# Patient Record
Sex: FEMALE | Race: WHITE | NOT HISPANIC OR LATINO | Employment: OTHER | URBAN - METROPOLITAN AREA
[De-identification: names, ages, dates, MRNs, and addresses within clinical notes are randomized per-mention and may not be internally consistent; named-entity substitution may affect disease eponyms.]

---

## 2022-08-01 DIAGNOSIS — M79.671 RIGHT FOOT PAIN: Primary | ICD-10-CM

## 2022-08-04 ENCOUNTER — PATIENT MESSAGE (OUTPATIENT)
Dept: ORTHOPEDICS | Facility: CLINIC | Age: 52
End: 2022-08-04

## 2022-08-04 ENCOUNTER — HOSPITAL ENCOUNTER (OUTPATIENT)
Dept: RADIOLOGY | Facility: HOSPITAL | Age: 52
Discharge: HOME | End: 2022-08-04
Attending: ORTHOPAEDIC SURGERY
Payer: COMMERCIAL

## 2022-08-04 ENCOUNTER — OFFICE VISIT (OUTPATIENT)
Dept: ORTHOPEDICS | Facility: CLINIC | Age: 52
End: 2022-08-04
Payer: COMMERCIAL

## 2022-08-04 VITALS — BODY MASS INDEX: 35.15 KG/M2 | HEIGHT: 68 IN | WEIGHT: 231.94 LBS

## 2022-08-04 DIAGNOSIS — S92.901K CLOSED FRACTURE OF RIGHT FOOT WITH NONUNION, SUBSEQUENT ENCOUNTER: ICD-10-CM

## 2022-08-04 DIAGNOSIS — M79.671 RIGHT FOOT PAIN: ICD-10-CM

## 2022-08-04 DIAGNOSIS — G90.521 COMPLEX REGIONAL PAIN SYNDROME I OF RIGHT LOWER LIMB: ICD-10-CM

## 2022-08-04 DIAGNOSIS — S92.024A CLOSED NONDISPLACED FRACTURE OF ANTERIOR PROCESS OF RIGHT CALCANEUS, INITIAL ENCOUNTER: Primary | ICD-10-CM

## 2022-08-04 PROCEDURE — 99999 PR PBB SHADOW E&M-EST. PATIENT-LVL IV: CPT | Mod: PBBFAC,,, | Performed by: ORTHOPAEDIC SURGERY

## 2022-08-04 PROCEDURE — 3008F PR BODY MASS INDEX (BMI) DOCUMENTED: ICD-10-PCS | Mod: CPTII,S$GLB,, | Performed by: ORTHOPAEDIC SURGERY

## 2022-08-04 PROCEDURE — 1159F MED LIST DOCD IN RCRD: CPT | Mod: CPTII,S$GLB,, | Performed by: ORTHOPAEDIC SURGERY

## 2022-08-04 PROCEDURE — 73630 XR FOOT COMPLETE 3 VIEW RIGHT: ICD-10-PCS | Mod: 26,RT,, | Performed by: RADIOLOGY

## 2022-08-04 PROCEDURE — 99999 PR PBB SHADOW E&M-EST. PATIENT-LVL IV: ICD-10-PCS | Mod: PBBFAC,,, | Performed by: ORTHOPAEDIC SURGERY

## 2022-08-04 PROCEDURE — 3008F BODY MASS INDEX DOCD: CPT | Mod: CPTII,S$GLB,, | Performed by: ORTHOPAEDIC SURGERY

## 2022-08-04 PROCEDURE — 73630 X-RAY EXAM OF FOOT: CPT | Mod: 26,RT,, | Performed by: RADIOLOGY

## 2022-08-04 PROCEDURE — 99204 PR OFFICE/OUTPT VISIT, NEW, LEVL IV, 45-59 MIN: ICD-10-PCS | Mod: S$GLB,,, | Performed by: ORTHOPAEDIC SURGERY

## 2022-08-04 PROCEDURE — 1159F PR MEDICATION LIST DOCUMENTED IN MEDICAL RECORD: ICD-10-PCS | Mod: CPTII,S$GLB,, | Performed by: ORTHOPAEDIC SURGERY

## 2022-08-04 PROCEDURE — 73630 X-RAY EXAM OF FOOT: CPT | Mod: TC,RT

## 2022-08-04 PROCEDURE — 99204 OFFICE O/P NEW MOD 45 MIN: CPT | Mod: S$GLB,,, | Performed by: ORTHOPAEDIC SURGERY

## 2022-08-04 NOTE — PROGRESS NOTES
Patient ID: Jorge Chavez  YOB: 1970  MRN: [id number]    Chief Complaint: Pain and Injury of the Right Foot    Referred By: Dr. Latisha Waggoner's cousin    History of Present Illness: Jorge Chavez is a 52 y.o. female   homemaker with history of heart disease/surgery with a chief complaint of Pain and Injury of the Right Foot    Ms. Chavez presents today for second opinion of right foot injury.  She injured her foot on 22 when she tripped and landed with full weight on her foot.  She heard a pop and had immediate pain, swelling, and inability to bear weight.  She was initially treated at the ED and saw [facility] Ch[facility].  XR demonstrated a lateral process calcaneus fracture.  Conservative treatment was initiated with a walking boot and limited weightbearing.  An MRI was also ordered to evaluate ligaments, which confirmed the XR diagnosis.  After a few months she transferred her care to Dr. Garcia with [facility] due to proximity to her home in [place].  She continued to have persistent pain.  Dr. Garcia ordered new XR, diagnosed her with nonunion, and placed her in a boot, then a cast.  She removed her own cast after 5 weeks at the end of July because she accidentally got it wet.  She has put herself back in the boot in the meantime and uses a knee scooter.    She complains of persistent lateral foot and ankle pain and inability to bear weight.  She would like a second opinion to determine if there is additional treatment that would help her foot heal more quickly.  She is currently using ibuprofen and tylenol.    Past Medical History:   Past Medical History:   Diagnosis Date    ASD (atrial septal defect)     Had surgery to repair in .    Migraine     Tachycardia      Past Surgical History:   Procedure Laterality Date    ASD REPAIR      BILATERAL TUBAL LIGATION      BREAST RECONSTRUCTION Bilateral 2003     SECTION      x 2    ENDOMETRIAL  "ABLATION      TOTAL REDUCTION MAMMOPLASTY Bilateral 2003     Family History   Problem Relation Age of Onset    Diabetes Paternal Grandfather     Diabetes Paternal Grandmother     Diabetes Maternal Grandmother     Diabetes Maternal Grandfather     Diabetes Father     Guillain-Columbus syndrome Father     Heart disease Father     Diabetes Mother     Stroke Mother          from stroke @ age 75    Stroke Brother         Brother  from stroke @ age 56     Social History     Socioeconomic History    Marital status:    Tobacco Use    Smoking status: Never Smoker    Smokeless tobacco: Never Used   Substance and Sexual Activity    Alcohol use: Never    Drug use: Never    Sexual activity: Yes     Partners: Male     Birth control/protection: See Surgical Hx     Comment: Tubal Ligation 2006     Medication List with Changes/Refills   Current Medications    ASCORBIC ACID, VITAMIN C, (VITAMIN C) 1000 MG TABLET    Take 1,000 mg by mouth once daily.    CYANOCOBALAMIN/THIAMINE HCL (HERMELINDO-B-12 ORAL)    Take by mouth.    ESTRADIOL (ESTRACE) 0.01 % (0.1 MG/GRAM) VAGINAL CREAM        ESTRADIOL-NORETHINDRONE (ACTIVELLA) 1-0.5 MG PER TABLET    Take 1 tablet by mouth once daily.    PROPRANOLOL (INDERAL LA) 80 MG 24 HR CAPSULE        TOLTERODINE (DETROL LA) 2 MG CP24        VITAMIN D (VITAMIN D3) 1000 UNITS TAB    Take 1,000 Units by mouth once daily.    ZINC SULFATE (ZINC-15) 66 MG TAB    Take by mouth.   Discontinued Medications    ONETOUCH VERIO TEST STRIPS STRP         Review of patient's allergies indicates:   Allergen Reactions    Clindamycin Blisters, Hives, Itching, Nausea Only, Rash and Swelling    Erythromycin base Anaphylaxis    Latex, natural rubber Hives, Itching, Rash and Shortness Of Breath    Penicillins Anaphylaxis and Swelling     Allergic to all "Cillins!"    Povidone-iodine Rash    Sulfa (sulfonamide antibiotics) Anaphylaxis, Hives, Itching and Swelling    Adhesive Blisters, " Dermatitis, Hives, Itching and Rash    Aspirin Nausea And Vomiting    Tuberculin robyn-ppd Rash     ROS    Physical Exam:   Body mass index is 35.26 kg/m².  There were no vitals filed for this visit.   GENERAL: Well appearing, appropriate for stated age, no acute distress.  CARDIOVASCULAR: Pulses regular by peripheral palpation.  PULMONARY: Respirations are even and non-labored.  NEURO: Awake, alert, and oriented x 3.  PSYCH: Mood & affect are appropriate.  HEENT: Head is normocephalic and atraumatic.    Right ankle:  Inspection: General atrophy, mild swelling sinus tarsi  Palpation tenderness: Tender lateral process calcaneus, cuboid, anterior tibiofibular ligament  Range of motion: Full ROM unrestricted, equal bilaterally  Strength:  4/5 DF    4/5 EHL    5/5 PF    5/5 Inversion    4/5 Eversion with pain  Special Tests: - Ant Drawer with pain  N/V Exam:  Tibial:    Normal sensory (plantar foot)  Normal motor (FHL)    Sup Peroneal:   Diminished sensory (dorsal foot) Diminished motor (Peroneals/Ant Tib)            Deep Peroneal:   Diminished sensory (1st web space) Diminished motor (EHL)    Sural:   Normal sensory (lateral foot)   Saphenous:   Normal sensory (medial lower leg)   Normal pedal pulses, warm and well perfused with capillary refill < 2 sec    Calf soft and compressible    Imaging:    X-Ray Foot Complete Right  Narrative: EXAMINATION:  XR FOOT COMPLETE 3 VIEW RIGHT    CLINICAL HISTORY:  . Pain in right foot    TECHNIQUE:  AP, lateral, and oblique views of the right foot were performed.    COMPARISON:  None    FINDINGS:  There is subtle cortical irregularity and thin linear lucency seen involving the anterior process of the calcaneus which is concerning for possible nondisplaced fracture.  Recommend clinical correlation for site of tenderness.  Remaining visualized osseous structures appear intact.  No evidence of dislocation.  There are mild degenerative findings present at the 1st MTP joint and scattered  throughout the midfoot.  No erosive changes demonstrated.  Impression: Possible nondisplaced fracture involving the anterior process of the calcaneus    This report was flagged in Epic as abnormal..    Electronically signed by: Steve Ocampo MD  Date:    08/04/2022  Time:    08:10    MRI R foot 1/24/2022 reviewed today  Impression Qi Soler MD  1. Bone marrow edema and contusion, bone bruise in the anterior process of the calcaneus with a linear nondisplaced fracture of the anterosuperior lateral lip of the anterior process of the calcaneus. Bone marrow edema and contusion, bone bruise in the cuboid bone across the calcaneocuboid articulation.  2. Small ankle joint effusion.  Mild retrocalcaneal bursitis.    Relevant imaging results reviewed and interpreted by me, discussed with the patient and / or family today. I agree with findings stated above.       Patient Instructions     Assessment:  Jorge Chavez is a 52 y.o. 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    Closed nondisplaced fracture of anterior process of right calcaneus, initial encounter Yes    Closed fracture of right foot with nonunion, subsequent encounter       Plan:   Order CT scan and MRI of right foot   Continue NWB in walking boot with knee scooter   Referral to interventional pain management to discuss CRPS   May consider bone stimulator pending imaging results    Follow-up: after imaging or sooner if there are any problems between now and then.    Leave Review:    Google: Leave Google Review   Healthgrades: Leave Healthgrades Review    After Hours Number: [phone]        Provider Note/Medical Decision Making:      I discussed worrisome and red flag signs and symptoms with the patient. The patient expressed understanding and agreed to  alert me immediately or to go to the emergency room if they experience any of these.    Treatment plan was developed with input from the patient/family, and they expressed understanding and agreement with the plan. All questions were answered today.    Disclaimer: This note was prepared using a voice recognition system and is likely to have sound alike errors within the text.     I, Sachin Borges, acted as a scribe for Yamil Good MD for the duration of this office visit.

## 2022-08-04 NOTE — PATIENT INSTRUCTIONS
Assessment:  Jorge Chavez is a 52 y.o. 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    Closed nondisplaced fracture of anterior process of right calcaneus, initial encounter Yes    Closed fracture of right foot with nonunion, subsequent encounter       Plan:  Order CT scan and MRI of right foot  Continue NWB in walking boot with knee scooter  Referral to interventional pain management to discuss CRPS  May consider bone stimulator pending imaging results    Follow-up: after imaging or sooner if there are any problems between now and then.    Leave Review:   Google: Leave Google Review  Healthgrades: Leave Healthgrades Review    After Hours Number: [phone]

## 2022-08-08 ENCOUNTER — HOSPITAL ENCOUNTER (OUTPATIENT)
Dept: RADIOLOGY | Facility: HOSPITAL | Age: 52
Discharge: HOME | End: 2022-08-08
Attending: ORTHOPAEDIC SURGERY
Payer: COMMERCIAL

## 2022-08-08 DIAGNOSIS — S92.024A CLOSED NONDISPLACED FRACTURE OF ANTERIOR PROCESS OF RIGHT CALCANEUS, INITIAL ENCOUNTER: ICD-10-CM

## 2022-08-08 PROCEDURE — 73718 MRI LOWER EXTREMITY W/O DYE: CPT | Mod: 26,RT,, | Performed by: RADIOLOGY

## 2022-08-08 PROCEDURE — 73700 CT LOWER EXTREMITY W/O DYE: CPT | Mod: TC,PO,RT

## 2022-08-08 PROCEDURE — 73700 CT LOWER EXTREMITY W/O DYE: CPT | Mod: 26,RT,, | Performed by: RADIOLOGY

## 2022-08-08 PROCEDURE — 73718 MRI LOWER EXTREMITY W/O DYE: CPT | Mod: TC,PO,RT

## 2022-08-08 PROCEDURE — 73700 CT FOOT WITHOUT CONTRAST RIGHT: ICD-10-PCS | Mod: 26,RT,, | Performed by: RADIOLOGY

## 2022-08-08 PROCEDURE — 73718 MRI FOOT (MIDFOOT) RIGHT WITHOUT CONTRAST: ICD-10-PCS | Mod: 26,RT,, | Performed by: RADIOLOGY

## 2022-08-09 ENCOUNTER — OFFICE VISIT (OUTPATIENT)
Dept: PAIN MEDICINE | Facility: CLINIC | Age: 52
End: 2022-08-09
Payer: COMMERCIAL

## 2022-08-09 VITALS
WEIGHT: 230.63 LBS | HEIGHT: 68 IN | DIASTOLIC BLOOD PRESSURE: 77 MMHG | SYSTOLIC BLOOD PRESSURE: 165 MMHG | HEART RATE: 62 BPM | BODY MASS INDEX: 34.95 KG/M2

## 2022-08-09 DIAGNOSIS — G90.521 COMPLEX REGIONAL PAIN SYNDROME I OF RIGHT LOWER LIMB: Primary | ICD-10-CM

## 2022-08-09 DIAGNOSIS — S92.024A CLOSED NONDISPLACED FRACTURE OF ANTERIOR PROCESS OF RIGHT CALCANEUS, INITIAL ENCOUNTER: ICD-10-CM

## 2022-08-09 PROCEDURE — 99203 PR OFFICE/OUTPT VISIT, NEW, LEVL III, 30-44 MIN: ICD-10-PCS | Mod: S$GLB,,, | Performed by: ANESTHESIOLOGY

## 2022-08-09 PROCEDURE — 1159F MED LIST DOCD IN RCRD: CPT | Mod: CPTII,S$GLB,, | Performed by: ANESTHESIOLOGY

## 2022-08-09 PROCEDURE — 3078F PR MOST RECENT DIASTOLIC BLOOD PRESSURE < 80 MM HG: ICD-10-PCS | Mod: CPTII,S$GLB,, | Performed by: ANESTHESIOLOGY

## 2022-08-09 PROCEDURE — 99999 PR PBB SHADOW E&M-EST. PATIENT-LVL IV: ICD-10-PCS | Mod: PBBFAC,,, | Performed by: ANESTHESIOLOGY

## 2022-08-09 PROCEDURE — 3077F SYST BP >= 140 MM HG: CPT | Mod: CPTII,S$GLB,, | Performed by: ANESTHESIOLOGY

## 2022-08-09 PROCEDURE — 3008F PR BODY MASS INDEX (BMI) DOCUMENTED: ICD-10-PCS | Mod: CPTII,S$GLB,, | Performed by: ANESTHESIOLOGY

## 2022-08-09 PROCEDURE — 3077F PR MOST RECENT SYSTOLIC BLOOD PRESSURE >= 140 MM HG: ICD-10-PCS | Mod: CPTII,S$GLB,, | Performed by: ANESTHESIOLOGY

## 2022-08-09 PROCEDURE — 3078F DIAST BP <80 MM HG: CPT | Mod: CPTII,S$GLB,, | Performed by: ANESTHESIOLOGY

## 2022-08-09 PROCEDURE — 99999 PR PBB SHADOW E&M-EST. PATIENT-LVL IV: CPT | Mod: PBBFAC,,, | Performed by: ANESTHESIOLOGY

## 2022-08-09 PROCEDURE — 3008F BODY MASS INDEX DOCD: CPT | Mod: CPTII,S$GLB,, | Performed by: ANESTHESIOLOGY

## 2022-08-09 PROCEDURE — 99203 OFFICE O/P NEW LOW 30 MIN: CPT | Mod: S$GLB,,, | Performed by: ANESTHESIOLOGY

## 2022-08-09 PROCEDURE — 1159F PR MEDICATION LIST DOCUMENTED IN MEDICAL RECORD: ICD-10-PCS | Mod: CPTII,S$GLB,, | Performed by: ANESTHESIOLOGY

## 2022-08-09 RX ORDER — GABAPENTIN 300 MG/1
600 CAPSULE ORAL NIGHTLY
Qty: 60 CAPSULE | Refills: 1 | Status: SHIPPED | OUTPATIENT
Start: 2022-08-09 | End: 2022-08-24

## 2022-08-09 NOTE — PROGRESS NOTES
New Patient Interventional Pain Note (Initial Visit)    Referring Physician: Yamil Good MD    PCP: Primary Doctor No    Chief Complaint:   Chief Complaint   Patient presents with    Foot Pain        SUBJECTIVE:    Jorge Chavez is a 52 y.o. female who presents to the clinic for the evaluation of right foot pain.   Patient suffered right foot injury on 01/04/2022.  She was initially evaluated by Dr. Olvera.  Patient was initially given a boot for lateral process calcaneus fracture, however this was then discontinued after 3 weeks.  She then was evaluated by Dr. Garcia reason recommended casting of the right foot for 5 weeks.  Patient reports minimal relief after these episodes of wearing the boot in the cast.  She reports since her injury that she has had fluctuating episodes of pain in her right foot.  Pain is described as a burning stabbing pain across the lateral aspect of her right foot with associated color changes, weakness, flexion, hyperalgesia, cold intolerance, and decreased sensation of the lateral aspect of her right foot.  Pain is worse with standing and at night, better during the day and with rest.  Primary pain is a burning pain over the lateral aspect right foot.    The pain is rated with a score of  3/10 on the BEST day and a score of 9/10 on the WORST day.  Symptoms interfere with daily activity, sleeping and work. The pain is exacerbated by Walking and Night Time.  The pain is mitigated by medications and rest.     Patient denies night fever/night sweats, urinary incontinence, bowel incontinence, significant weight loss, significant motor weakness and loss of sensations.      Non-Pharmacologic Treatments:  Physical Therapy/Home Exercise: yes  Ice/Heat:yes  TENS: no  Acupuncture: no  Massage: no  Chiropractic: no        Previous Pain Medications:  NSAIDs, Tylenol, topicals     report:  Reviewed and consistent with medication use as prescribed.    Pain Procedures:   none      Imaging:   MRI  Foot (Midfoot) Right Without Contrast  Narrative: EXAMINATION:  MRI FOOT (MIDFOOT) RIGHT WITHOUT CONTRAST    CLINICAL HISTORY:  anterior process calcaneus nonunion;  Nondisplaced fracture of anterior process of right calcaneus, initial encounter for closed fracture    TECHNIQUE:  Multisequence, multiplanar MR imaging of the midfoot performed without contrast.    COMPARISON:  Prior radiograph and CT    FINDINGS:  Again identified is the nondisplaced fracture within the anterior process of the calcaneus with mild adjacent edema present.  Remaining osseous structures intact.  Visualized soft tissues are unremarkable.  Impression: Nonunited nondisplaced anterior process calcaneal fracture with mild edema adjacent to the fracture line.    Electronically signed by: Sami Orozco MD  Date:    08/08/2022  Time:    12:45  CT Foot Without Contrast Right  Narrative: EXAMINATION:  CT FOOT WITHOUT CONTRAST RIGHT    CLINICAL HISTORY:  Fracture, foot;  Nondisplaced fracture of anterior process of right calcaneus, initial encounter for closed fracture    TECHNIQUE:  Multi detector volumetric CT imaging of the left foot was obtained.    RADIATION DOSE:  One thousand one hundred seventy-four DLP.    Automated exposure control was utilized    COMPARISON:  Conventional radiographs of the right foot 08/04/2022    FINDINGS:  There is a nondisplaced chronic/remote appearing fracture of the anterior process of the calcaneus best identified on the sagittal reconstructed images, image 36 of series 201.  This has well corticated margins with a persistent radiolucency along the fracture consistent with a nonunion.  There is synovial thickening along the nonunion.  Osseous structures are well mineralized.  Enthesophytes arise from the calcaneus at the Achilles insertion.  Impression: 1. Remote fracture of the anterior process of the right calcaneus with nonunion.    Electronically signed by: Randy Schuster  "MD  Date:    2022  Time:    11:25          Past Medical History:   Diagnosis Date    ASD (atrial septal defect)     Had surgery to repair in .    Migraine     Tachycardia      Past Surgical History:   Procedure Laterality Date    ASD REPAIR      BILATERAL TUBAL LIGATION      BREAST RECONSTRUCTION Bilateral 2003     SECTION      x 2    ENDOMETRIAL ABLATION  2010    TOTAL REDUCTION MAMMOPLASTY Bilateral 2003     Social History     Socioeconomic History    Marital status:    Tobacco Use    Smoking status: Never Smoker    Smokeless tobacco: Never Used   Substance and Sexual Activity    Alcohol use: Never    Drug use: Never    Sexual activity: Yes     Partners: Male     Birth control/protection: See Surgical Hx     Comment: Tubal Ligation 2006     Family History   Problem Relation Age of Onset    Diabetes Paternal Grandfather     Diabetes Paternal Grandmother     Diabetes Maternal Grandmother     Diabetes Maternal Grandfather     Diabetes Father     Guillain-Tichnor syndrome Father     Heart disease Father     Diabetes Mother     Stroke Mother          from stroke @ age 75    Stroke Brother         Brother  from stroke @ age 56       Review of patient's allergies indicates:   Allergen Reactions    Clindamycin Blisters, Hives, Itching, Nausea Only, Rash and Swelling    Erythromycin base Anaphylaxis    Latex, natural rubber Hives, Itching, Rash and Shortness Of Breath    Penicillins Anaphylaxis and Swelling     Allergic to all "Cillins!"    Povidone-iodine Rash    Sulfa (sulfonamide antibiotics) Anaphylaxis, Hives, Itching and Swelling    Adhesive Blisters, Dermatitis, Hives, Itching and Rash    Aspirin Nausea And Vomiting    Tuberculin robyn-ppd Rash       Current Outpatient Medications   Medication Sig    ascorbic acid, vitamin C, (VITAMIN C) 1000 MG tablet Take 1,000 mg by mouth once daily.    cyanocobalamin/thiamine HCl (HERMELINDO-B-12 ORAL) Take " "by mouth.    estradioL (ESTRACE) 0.01 % (0.1 mg/gram) vaginal cream     estradiol-norethindrone (ACTIVELLA) 1-0.5 mg per tablet Take 1 tablet by mouth once daily.    propranoloL (INDERAL LA) 80 MG 24 hr capsule     tolterodine (DETROL LA) 2 MG Cp24     vitamin D (VITAMIN D3) 1000 units Tab Take 1,000 Units by mouth once daily.    zinc sulfate (ZINC-15) 66 mg Tab Take by mouth.    gabapentin (NEURONTIN) 300 MG capsule Take 2 capsules (600 mg total) by mouth every evening.     No current facility-administered medications for this visit.         ROS  Review of Systems   Constitutional: Negative for chills, diaphoresis, fatigue and fever.   HENT: Negative for ear discharge, ear pain, rhinorrhea, trouble swallowing and voice change.    Eyes: Negative for pain and redness.   Respiratory: Negative for chest tightness, shortness of breath, wheezing and stridor.    Cardiovascular: Positive for leg swelling. Negative for chest pain.   Gastrointestinal: Negative for blood in stool, diarrhea, nausea and vomiting.   Endocrine: Positive for cold intolerance. Negative for heat intolerance.   Genitourinary: Negative for dysuria, hematuria and urgency.   Musculoskeletal: Positive for arthralgias, gait problem, joint swelling and myalgias. Negative for back pain, neck pain and neck stiffness.   Skin: Positive for color change. Negative for rash.   Neurological: Positive for weakness and numbness. Negative for tremors, seizures, speech difficulty, light-headedness and headaches.   Hematological: Does not bruise/bleed easily.   Psychiatric/Behavioral: Negative for agitation, confusion and suicidal ideas. The patient is not nervous/anxious.             OBJECTIVE:  BP (!) 165/77   Pulse 62   Ht 5' 8" (1.727 m)   Wt 104.6 kg (230 lb 9.6 oz)   BMI 35.06 kg/m²         Physical Exam  Constitutional:       General: She is not in acute distress.     Appearance: Normal appearance. She is not ill-appearing.   HENT:      Head: " Normocephalic and atraumatic.      Nose: No congestion or rhinorrhea.      Mouth/Throat:      Mouth: Mucous membranes are moist.   Eyes:      Extraocular Movements: Extraocular movements intact.      Pupils: Pupils are equal, round, and reactive to light.   Cardiovascular:      Pulses: Normal pulses.   Pulmonary:      Effort: Pulmonary effort is normal.   Abdominal:      General: Abdomen is flat.      Palpations: Abdomen is soft.   Musculoskeletal:      Cervical back: Normal range of motion and neck supple.      Right ankle: Swelling and deformity present. No ecchymosis or lacerations. Tenderness present over the lateral malleolus (hyperalgesic). Decreased range of motion.      Left ankle: Normal.   Skin:     General: Skin is warm and dry.      Capillary Refill: Capillary refill takes less than 2 seconds.      Comments: Flushing of right foot after boot is removed   Neurological:      Mental Status: She is alert and oriented to person, place, and time.      Cranial Nerves: No cranial nerve deficit.      Sensory: Sensory deficit present.      Motor: Weakness present. No abnormal muscle tone.      Gait: Gait normal.      Deep Tendon Reflexes: Babinski sign absent on the right side. Babinski sign absent on the left side.      Reflex Scores:       Tricep reflexes are 2+ on the right side and 2+ on the left side.       Bicep reflexes are 2+ on the right side and 2+ on the left side.       Brachioradialis reflexes are 2+ on the right side and 2+ on the left side.       Patellar reflexes are 1+ on the right side and 1+ on the left side.       Achilles reflexes are 1+ on the right side and 1+ on the left side.     Comments: 4/5 strength in right dorsiflexion and plantar flexion  Decreased light touch over lateral aspect of right foot   Psychiatric:         Mood and Affect: Mood normal.         Behavior: Behavior normal.         Thought Content: Thought content normal.           Musculoskeletal:        Lumbar Exam  Incision:  no  Pain with Flexion: no  Pain with Extension: no  ROM: FROM            ASSESSMENT:       52 y.o. year old female with right foot pain, consistent with     1. Complex regional pain syndrome i of right lower limb  Ambulatory referral/consult to Pain Clinic    gabapentin (NEURONTIN) 300 MG capsule    IR Sympathetic Nerve Block Lumbar    Case Request-RAD/Other Procedure Area: Right, Lumbar sympathetic block   2. Closed nondisplaced fracture of anterior process of right calcaneus, initial encounter  Ambulatory referral/consult to Pain Clinic     Complex regional pain syndrome i of right lower limb  -     Ambulatory referral/consult to Pain Clinic  -     gabapentin (NEURONTIN) 300 MG capsule; Take 2 capsules (600 mg total) by mouth every evening.  Dispense: 60 capsule; Refill: 1  -     IR Sympathetic Nerve Block Lumbar; Future; Expected date: 08/09/2022  -     Case Request-RAD/Other Procedure Area: Right, Lumbar sympathetic block    Closed nondisplaced fracture of anterior process of right calcaneus, initial encounter  -     Ambulatory referral/consult to Pain Clinic             PLAN:   - Interventions:   We will schedule patient for right lumbar sympathetic block for right foot pain consistent with CRPS type 1 of the right lower extremity  - Anticoagulation use:   no no anticoagulation    - Medications:   Start gabapentin 600 mg at night for neuropathic pain    - Therapy:    Continue formal physical therapy for right foot pain with nondisplaced fracture of the anterior process of calcaneus    - Imaging:   MRI and CT of right foot reviewed and findings discussed with patient    - Consults:   Continue follow-up with orthopedics for right nondisplaced fracture of anterior process of calcaneus    - Counseled patient regarding the importance of activity modification and physical therapy    - Patient Questions: Answered all of the patient's questions regarding diagnosis, therapy, and treatment    - Follow up visit: return to  clinic 2 weeks after procedure        The above plan and management options were discussed at length with patient. Patient is in agreement with the above and verbalized understanding.    I discussed the goals of interventional chronic pain management with the patient on today's visit.  I explained the utility of injections for diagnostic and therapeutic purposes.  We discussed a multimodal approach to pain including treating the patient's given worst pain at any given time.  We will use a systematic approach to addressing pain.  We will also adopt a multimodal approach that includes injections, adjuvant medications, physical therapy, at times psychiatry.  There may be a limited role for opioid use intermittently in the treatment of pain, more particularly for acute pain although no one approach can be used as a sole treatment modality.    I emphasized the importance of regular exercise, core strengthening and stretching, diet and weight loss as a cornerstone of long-term pain management.      Félix Hussein MD  Interventional Pain Management  [facility]    Disclaimer:  This note was prepared using voice recognition system and is likely to have sound alike errors that may have been overlooked even after proof reading.  Please call me with any questions

## 2022-08-18 ENCOUNTER — TELEPHONE (OUTPATIENT)
Dept: PAIN MEDICINE | Facility: CLINIC | Age: 52
End: 2022-08-18
Payer: COMMERCIAL

## 2022-08-18 ENCOUNTER — OFFICE VISIT (OUTPATIENT)
Dept: ORTHOPEDICS | Facility: CLINIC | Age: 52
End: 2022-08-18
Payer: COMMERCIAL

## 2022-08-18 VITALS — HEIGHT: 68 IN | WEIGHT: 230 LBS | BODY MASS INDEX: 34.86 KG/M2

## 2022-08-18 DIAGNOSIS — G90.521 COMPLEX REGIONAL PAIN SYNDROME I OF RIGHT LOWER LIMB: ICD-10-CM

## 2022-08-18 DIAGNOSIS — S92.024K CLOSED NONDISPLACED FRACTURE OF ANTERIOR PROCESS OF RIGHT CALCANEUS WITH NONUNION, SUBSEQUENT ENCOUNTER: Primary | ICD-10-CM

## 2022-08-18 PROCEDURE — 1159F PR MEDICATION LIST DOCUMENTED IN MEDICAL RECORD: ICD-10-PCS | Mod: CPTII,S$GLB,, | Performed by: ORTHOPAEDIC SURGERY

## 2022-08-18 PROCEDURE — 3008F BODY MASS INDEX DOCD: CPT | Mod: CPTII,S$GLB,, | Performed by: ORTHOPAEDIC SURGERY

## 2022-08-18 PROCEDURE — 1159F MED LIST DOCD IN RCRD: CPT | Mod: CPTII,S$GLB,, | Performed by: ORTHOPAEDIC SURGERY

## 2022-08-18 PROCEDURE — 99213 OFFICE O/P EST LOW 20 MIN: CPT | Mod: S$GLB,,, | Performed by: ORTHOPAEDIC SURGERY

## 2022-08-18 PROCEDURE — 99213 PR OFFICE/OUTPT VISIT, EST, LEVL III, 20-29 MIN: ICD-10-PCS | Mod: S$GLB,,, | Performed by: ORTHOPAEDIC SURGERY

## 2022-08-18 PROCEDURE — 99999 PR PBB SHADOW E&M-EST. PATIENT-LVL III: ICD-10-PCS | Mod: PBBFAC,,, | Performed by: ORTHOPAEDIC SURGERY

## 2022-08-18 PROCEDURE — 3008F PR BODY MASS INDEX (BMI) DOCUMENTED: ICD-10-PCS | Mod: CPTII,S$GLB,, | Performed by: ORTHOPAEDIC SURGERY

## 2022-08-18 PROCEDURE — 99999 PR PBB SHADOW E&M-EST. PATIENT-LVL III: CPT | Mod: PBBFAC,,, | Performed by: ORTHOPAEDIC SURGERY

## 2022-08-18 NOTE — PATIENT INSTRUCTIONS
Assessment:  Jorge Chavez is a 52 y.o. 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    Complex regional pain syndrome i of right lower limb     Closed nondisplaced fracture of anterior process of right calcaneus with nonunion, subsequent encounter Yes      Plan:  Referral to podiatry Dr. Rodriguez to discuss surgical intervention   Continue with pain management as scheduled       Follow-up: Referral to podiatry or sooner if there are any problems between now and then.    Leave Review:   Google: Leave Google Review  Healthgrades: Leave Healthgrades Review    After Hours Number: [phone]

## 2022-08-18 NOTE — PROGRESS NOTES
Patient ID: Jorge Chavez  YOB: 1970  MRN: [id number]    Chief Complaint: Pain of the Right Foot    Referred By: Dr. Latisha Waggoner's cousin    History of Present Illness: Jorge Chavez is a 52 y.o. female   homemaker with history of heart disease/surgery with a chief complaint of Pain of the Right Foot    The patient presents today for follow up evaluation of her right foot to review an CT and MRI of her right foot. She reports she has been in constant pain since January. She feels her bone is shifting. She presents today in a boot full weightbearing. She has seen pain management since her last visit who diagnosed her with complex region pain syndrome. They recommended a spinal block, but she does not want to proceed with this procedure.     Ms. Chavez presents today for second opinion of right foot injury.  She injured her foot on 1/4/22 when she tripped and landed with full weight on her foot.  She heard a pop and had immediate pain, swelling, and inability to bear weight.  She was initially treated at the ED and saw [facility] Ch[facility].  XR demonstrated a lateral process calcaneus fracture.  Conservative treatment was initiated with a walking boot and limited weightbearing.  An MRI was also ordered to evaluate ligaments, which confirmed the XR diagnosis.  After a few months she transferred her care to Dr. Garcia with [facility] due to proximity to her home in [place].  She continued to have persistent pain.  Dr. Garcia ordered new XR, diagnosed her with nonunion, and placed her in a boot, then a cast.  She removed her own cast after 5 weeks at the end of July because she accidentally got it wet.  She has put herself back in the boot in the meantime and uses a knee scooter.    She complains of persistent lateral foot and ankle pain and inability to bear weight.  She would like a second opinion to determine if there is additional treatment that would help her foot heal  more quickly.  She is currently using ibuprofen and tylenol.    Past Medical History:   Past Medical History:   Diagnosis Date    ASD (atrial septal defect)     Had surgery to repair in .    Migraine     Tachycardia      Past Surgical History:   Procedure Laterality Date    ASD REPAIR      BILATERAL TUBAL LIGATION      BREAST RECONSTRUCTION Bilateral 2003     SECTION      x 2    ENDOMETRIAL ABLATION  2010    TOTAL REDUCTION MAMMOPLASTY Bilateral 2003     Family History   Problem Relation Age of Onset    Diabetes Paternal Grandfather     Diabetes Paternal Grandmother     Diabetes Maternal Grandmother     Diabetes Maternal Grandfather     Diabetes Father     Guillain-Guthrie syndrome Father     Heart disease Father     Diabetes Mother     Stroke Mother          from stroke @ age 75    Stroke Brother         Brother  from stroke @ age 56     Social History     Socioeconomic History    Marital status:    Tobacco Use    Smoking status: Never Smoker    Smokeless tobacco: Never Used   Substance and Sexual Activity    Alcohol use: Never    Drug use: Never    Sexual activity: Yes     Partners: Male     Birth control/protection: See Surgical Hx     Comment: Tubal Ligation 2006     Medication List with Changes/Refills   Current Medications    ASCORBIC ACID, VITAMIN C, (VITAMIN C) 1000 MG TABLET    Take 1,000 mg by mouth once daily.    CYANOCOBALAMIN/THIAMINE HCL (HERMELINDO-B-12 ORAL)    Take by mouth.    ESTRADIOL (ESTRACE) 0.01 % (0.1 MG/GRAM) VAGINAL CREAM        ESTRADIOL-NORETHINDRONE (ACTIVELLA) 1-0.5 MG PER TABLET    Take 1 tablet by mouth once daily.    GABAPENTIN (NEURONTIN) 300 MG CAPSULE    Take 2 capsules (600 mg total) by mouth every evening.    PROPRANOLOL (INDERAL LA) 80 MG 24 HR CAPSULE        TOLTERODINE (DETROL LA) 2 MG CP24        VITAMIN D (VITAMIN D3) 1000 UNITS TAB    Take 1,000 Units by mouth once daily.    ZINC SULFATE (ZINC-15) 66 MG TAB    Take  "by mouth.     Review of patient's allergies indicates:   Allergen Reactions    Clindamycin Blisters, Hives, Itching, Nausea Only, Rash and Swelling    Erythromycin base Anaphylaxis    Latex, natural rubber Hives, Itching, Rash and Shortness Of Breath    Penicillins Anaphylaxis and Swelling     Allergic to all "Cillins!"    Povidone-iodine Rash    Sulfa (sulfonamide antibiotics) Anaphylaxis, Hives, Itching and Swelling    Adhesive Blisters, Dermatitis, Hives, Itching and Rash    Aspirin Nausea And Vomiting    Tuberculin robyn-ppd Rash     ROS    Physical Exam:   Body mass index is 34.97 kg/m².  There were no vitals filed for this visit.   GENERAL: Well appearing, appropriate for stated age, no acute distress.  CARDIOVASCULAR: Pulses regular by peripheral palpation.  PULMONARY: Respirations are even and non-labored.  NEURO: Awake, alert, and oriented x 3.  PSYCH: Mood & affect are appropriate.  HEENT: Head is normocephalic and atraumatic.    Right ankle:  Inspection: General atrophy, mild swelling sinus tarsi  Palpation tenderness: Tender lateral process calcaneus, cuboid, anterior tibiofibular ligament  Range of motion: Full ROM unrestricted, equal bilaterally  Strength:  4/5 DF    4/5 EHL    5/5 PF    5/5 Inversion    4/5 Eversion with pain  Special Tests: - Ant Drawer with pain  N/V Exam:  Tibial:    Normal sensory (plantar foot)  Normal motor (FHL)    Sup Peroneal:   Diminished sensory (dorsal foot) Diminished motor (Peroneals/Ant Tib)            Deep Peroneal:   Diminished sensory (1st web space) Diminished motor (EHL)    Sural:   Normal sensory (lateral foot)   Saphenous:   Normal sensory (medial lower leg)   Normal pedal pulses, warm and well perfused with capillary refill < 2 sec    Calf soft and compressible    Imaging:    MRI Foot (Midfoot) Right Without Contrast  Narrative: EXAMINATION:  MRI FOOT (MIDFOOT) RIGHT WITHOUT CONTRAST    CLINICAL HISTORY:  anterior process calcaneus nonunion;  " Nondisplaced fracture of anterior process of right calcaneus, initial encounter for closed fracture    TECHNIQUE:  Multisequence, multiplanar MR imaging of the midfoot performed without contrast.    COMPARISON:  Prior radiograph and CT    FINDINGS:  Again identified is the nondisplaced fracture within the anterior process of the calcaneus with mild adjacent edema present.  Remaining osseous structures intact.  Visualized soft tissues are unremarkable.  Impression: Nonunited nondisplaced anterior process calcaneal fracture with mild edema adjacent to the fracture line.    Electronically signed by: Sami Orozco MD  Date:    08/08/2022  Time:    12:45  CT Foot Without Contrast Right  Narrative: EXAMINATION:  CT FOOT WITHOUT CONTRAST RIGHT    CLINICAL HISTORY:  Fracture, foot;  Nondisplaced fracture of anterior process of right calcaneus, initial encounter for closed fracture    TECHNIQUE:  Multi detector volumetric CT imaging of the left foot was obtained.    RADIATION DOSE:  One thousand one hundred seventy-four DLP.    Automated exposure control was utilized    COMPARISON:  Conventional radiographs of the right foot 08/04/2022    FINDINGS:  There is a nondisplaced chronic/remote appearing fracture of the anterior process of the calcaneus best identified on the sagittal reconstructed images, image 36 of series 201.  This has well corticated margins with a persistent radiolucency along the fracture consistent with a nonunion.  There is synovial thickening along the nonunion.  Osseous structures are well mineralized.  Enthesophytes arise from the calcaneus at the Achilles insertion.  Impression: 1. Remote fracture of the anterior process of the right calcaneus with nonunion.    Electronically signed by: Randy Schuster MD  Date:    08/08/2022  Time:    11:25    MRI R foot 1/24/2022 reviewed today  Impression Qi Soler MD  1. Bone marrow edema and contusion, bone bruise in the anterior process of the  calcaneus with a linear nondisplaced fracture of the anterosuperior lateral lip of the anterior process of the calcaneus. Bone marrow edema and contusion, bone bruise in the cuboid bone across the calcaneocuboid articulation.  2. Small ankle joint effusion.  Mild retrocalcaneal bursitis.    Relevant imaging results reviewed and interpreted by me, discussed with the patient and / or family today. I agree with findings stated above.       Patient Instructions     Assessment:  Jorge Chavez is a 52 y.o. 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    Complex regional pain syndrome i of right lower limb     Closed nondisplaced fracture of anterior process of right calcaneus with nonunion, subsequent encounter Yes      Plan:   Referral to podiatry Dr. Rodriguez to discuss surgical intervention    Continue with pain management as scheduled       Follow-up: Referral to podiatry or sooner if there are any problems between now and then.    Leave Review:    Google: Leave Google Review   Healthgrades: Leave Healthgrades Review    After Hours Number: [phone]        Provider Note/Medical Decision Making:      I discussed worrisome and red flag signs and symptoms with the patient. The patient expressed understanding and agreed to alert me immediately or to go to the emergency room if they experience any of these.    Treatment plan was developed with input from the patient/family, and they expressed understanding and agreement with the plan. All questions were answered today.    Disclaimer: This note was prepared using a voice recognition system and is likely to have sound alike errors within the text.     I, Evy Ordonez, acted as a scribe for Yamil Good MD for the duration of this office visit.

## 2022-08-18 NOTE — TELEPHONE ENCOUNTER
Patient was cancelled for procedure scheduled on 8/31/22 per request.  She will call to reschedule at a later date.

## 2022-08-18 NOTE — TELEPHONE ENCOUNTER
----- Message from America Hurley sent at 8/18/2022 11:28 AM CDT -----  Contact: Jorge Patel would like a call back at [phone], Regards to getting her 8/31/22 procedure cancel.    Thanks  Td

## 2022-08-26 ENCOUNTER — TELEPHONE (OUTPATIENT)
Dept: PAIN MEDICINE | Facility: CLINIC | Age: 52
End: 2022-08-26
Payer: COMMERCIAL

## 2022-08-26 NOTE — TELEPHONE ENCOUNTER
----- Message from Chela Lowe sent at 8/26/2022  2:56 PM CDT -----  Contact: Jeff from [facility]  Is calling rg an authorization for the pt and can be reached at [phone]//thanks/dbw

## 2022-08-26 NOTE — TELEPHONE ENCOUNTER
Jeff with [facility] trying to reach Marilin with pre-service, advised [facility] and they can send message to pre-service.

## 2025-05-21 ENCOUNTER — PATIENT MESSAGE (OUTPATIENT)
Dept: CARDIOLOGY | Facility: CLINIC | Age: 55
End: 2025-05-21
Payer: COMMERCIAL

## 2025-06-02 ENCOUNTER — OFFICE VISIT (OUTPATIENT)
Dept: CARDIOLOGY | Facility: CLINIC | Age: 55
End: 2025-06-02
Payer: COMMERCIAL

## 2025-06-02 VITALS
DIASTOLIC BLOOD PRESSURE: 82 MMHG | HEART RATE: 73 BPM | SYSTOLIC BLOOD PRESSURE: 126 MMHG | OXYGEN SATURATION: 96 % | WEIGHT: 196.31 LBS | HEIGHT: 68 IN | BODY MASS INDEX: 29.75 KG/M2

## 2025-06-02 DIAGNOSIS — R00.2 PALPITATIONS: ICD-10-CM

## 2025-06-02 DIAGNOSIS — Z98.890 S/P MITRAL VALVE REPAIR: ICD-10-CM

## 2025-06-02 DIAGNOSIS — Z87.74 H/O CONGENITAL ATRIAL SEPTAL DEFECT (ASD) REPAIR: Primary | ICD-10-CM

## 2025-06-02 DIAGNOSIS — E78.5 HYPERLIPIDEMIA, UNSPECIFIED HYPERLIPIDEMIA TYPE: ICD-10-CM

## 2025-06-02 PROCEDURE — 99999 PR PBB SHADOW E&M-EST. PATIENT-LVL III: CPT | Mod: PBBFAC,,, | Performed by: INTERNAL MEDICINE

## 2025-06-02 RX ORDER — IBUPROFEN 800 MG/1
800 TABLET, FILM COATED ORAL EVERY 6 HOURS
COMMUNITY
Start: 2025-05-08

## 2025-06-02 RX ORDER — PROPRANOLOL HYDROCHLORIDE 40 MG/1
40 TABLET ORAL DAILY
Qty: 90 TABLET | Refills: 3 | Status: SHIPPED | OUTPATIENT
Start: 2025-06-02

## 2025-06-03 ENCOUNTER — PATIENT MESSAGE (OUTPATIENT)
Dept: CARDIOLOGY | Facility: CLINIC | Age: 55
End: 2025-06-03
Payer: COMMERCIAL

## 2025-06-04 LAB
OHS QRS DURATION: 92 MS
OHS QTC CALCULATION: 456 MS

## 2025-06-09 ENCOUNTER — TELEPHONE (OUTPATIENT)
Dept: CARDIOLOGY | Facility: CLINIC | Age: 55
End: 2025-06-09
Payer: COMMERCIAL

## 2025-06-09 NOTE — TELEPHONE ENCOUNTER
Spoke with patient. Advise patient office received a message from [employer]. Echo has not been approved, advising to rescheduled.   Patient verbalized understanding and assisted rescheduling to 06/20/25 at 9 am.     Swathi SIDHU

## 2025-06-12 ENCOUNTER — PATIENT MESSAGE (OUTPATIENT)
Dept: CARDIOLOGY | Facility: CLINIC | Age: 55
End: 2025-06-12
Payer: COMMERCIAL

## 2025-08-01 ENCOUNTER — HOSPITAL ENCOUNTER (OUTPATIENT)
Dept: CARDIOLOGY | Facility: HOSPITAL | Age: 55
Discharge: HOME | End: 2025-08-01
Attending: INTERNAL MEDICINE
Payer: COMMERCIAL

## 2025-08-01 VITALS
WEIGHT: 196 LBS | DIASTOLIC BLOOD PRESSURE: 82 MMHG | HEART RATE: 75 BPM | SYSTOLIC BLOOD PRESSURE: 126 MMHG | HEIGHT: 68 IN | BODY MASS INDEX: 29.7 KG/M2

## 2025-08-01 DIAGNOSIS — Z87.74 H/O CONGENITAL ATRIAL SEPTAL DEFECT (ASD) REPAIR: ICD-10-CM

## 2025-08-01 LAB
AORTIC ROOT ANNULUS: 2.9 CM
AORTIC SIZE INDEX (SOV): 1.4 CM/M2
AV INDEX (PROSTH): 0.73
AV MEAN GRADIENT: 5 MMHG
AV PEAK GRADIENT: 9 MMHG
AV REGURGITATION PRESSURE HALF TIME: 628 MS
AV VALVE AREA BY VELOCITY RATIO: 2.5 CM²
AV VALVE AREA: 2.3 CM²
AV VELOCITY RATIO: 0.8
BSA FOR ECHO PROCEDURE: 2.07 M2
CV ECHO LV RWT: 0.51 CM
DOP CALC AO PEAK VEL: 1.5 M/S
DOP CALC AO VTI: 41.6 CM
DOP CALC LVOT AREA: 3.1 CM2
DOP CALC LVOT DIAMETER: 2 CM
DOP CALC LVOT PEAK VEL: 1.2 M/S
DOP CALC RVOT PEAK VEL: 0.66 M/S
DOP CALC RVOT VTI: 15.9 CM
DOP CALCLVOT PEAK VEL VTI: 30.5 CM
E WAVE DECELERATION TIME: 200 MSEC
E/A RATIO: 1.2
E/E' RATIO: 17 M/S
ECHO LV POSTERIOR WALL: 1 CM (ref 0.6–1.1)
FRACTIONAL SHORTENING: 38.5 % (ref 28–44)
INTERVENTRICULAR SEPTUM: 1 CM (ref 0.6–1.1)
LA MAJOR: 3.9 CM
LA MINOR: 3.9 CM
LA WIDTH: 3.7 CM
LEFT ATRIUM AREA SYSTOLIC (APICAL 2 CHAMBER): 14.06 CM2
LEFT ATRIUM AREA SYSTOLIC (APICAL 4 CHAMBER): 15.36 CM2
LEFT ATRIUM SIZE: 4.4 CM
LEFT ATRIUM VOLUME INDEX MOD: 19 ML/M2
LEFT ATRIUM VOLUME INDEX: 27 ML/M2
LEFT ATRIUM VOLUME MOD: 38 ML
LEFT ATRIUM VOLUME: 54 CM3
LEFT INTERNAL DIMENSION IN SYSTOLE: 2.4 CM (ref 2.1–4)
LEFT VENTRICLE DIASTOLIC VOLUME INDEX: 31.53 ML/M2
LEFT VENTRICLE DIASTOLIC VOLUME: 64 ML
LEFT VENTRICLE END SYSTOLIC VOLUME APICAL 2 CHAMBER: 35.27 ML
LEFT VENTRICLE END SYSTOLIC VOLUME APICAL 4 CHAMBER: 39.25 ML
LEFT VENTRICLE MASS INDEX: 60.2 G/M2
LEFT VENTRICLE SYSTOLIC VOLUME INDEX: 9.9 ML/M2
LEFT VENTRICLE SYSTOLIC VOLUME: 20 ML
LEFT VENTRICULAR INTERNAL DIMENSION IN DIASTOLE: 3.9 CM (ref 3.5–6)
LEFT VENTRICULAR MASS: 122.1 G
LV LATERAL E/E' RATIO: 17.1 M/S
LV SEPTAL E/E' RATIO: 17.1 M/S
LVED V (TEICH): 64.29 ML
LVES V (TEICH): 19.69 ML
LVOT MG: 3.35 MMHG
LVOT MV: 0.88 CM/S
Lab: 1.7 CM/M
MV PEAK A VEL: 1 M/S
MV PEAK E VEL: 1.2 M/S
OHS CV CPX PATIENT HEIGHT IN: 68
OHS CV RV/LV RATIO: 1.1 CM
PISA AR MAX VEL: 3.99 M/S
PISA MRMAX VEL: 5.55 M/S
PISA TR MAX VEL: 2.6 M/S
PV MEAN GRADIENT: 1 MMHG
PV PEAK GRADIENT: 3 MMHG
PV PEAK VELOCITY: 0.93 M/S
RA MAJOR: 4.14 CM
RA PRESSURE ESTIMATED: 3 MMHG
RA WIDTH: 3.22 CM
RIGHT VENTRICLE DIASTOLIC BASEL DIMENSION: 4.3 CM
RIGHT VENTRICULAR END-DIASTOLIC DIMENSION: 4.3 CM
RV TB RVSP: 6 MMHG
SINUS: 2.9 CM
STJ: 2.9 CM
TDI LATERAL: 0.07 M/S
TDI SEPTAL: 0.07 M/S
TDI: 0.07 M/S
TR MAX PG: 27 MMHG
TRICUSPID ANNULAR PLANE SYSTOLIC EXCURSION: 1.4 CM
TV REST PULMONARY ARTERY PRESSURE: 30 MMHG
Z-SCORE OF LEFT VENTRICULAR DIMENSION IN END DIASTOLE: -4.33
Z-SCORE OF LEFT VENTRICULAR DIMENSION IN END SYSTOLE: -3.4

## 2025-08-01 PROCEDURE — 93306 TTE W/DOPPLER COMPLETE: CPT

## 2025-08-01 PROCEDURE — 93306 TTE W/DOPPLER COMPLETE: CPT | Mod: 26,,, | Performed by: INTERNAL MEDICINE

## 2025-08-01 NOTE — NURSING NOTE
Pt presented for an echocardiogram with bubble study per Dr. Costello.  Procedure was explained to the patient, She verbalized understanding.  IV, 22ga x 1 attempt, was started in the Rt. AC using aseptic technique.  Patient tolerated the procedure well.  IV discontinued, pressure dressing applied.